# Patient Record
Sex: FEMALE | Race: WHITE
[De-identification: names, ages, dates, MRNs, and addresses within clinical notes are randomized per-mention and may not be internally consistent; named-entity substitution may affect disease eponyms.]

---

## 2018-03-21 ENCOUNTER — HOSPITAL ENCOUNTER (EMERGENCY)
Dept: HOSPITAL 45 - C.EDD | Age: 18
Discharge: HOME | End: 2018-03-21
Payer: COMMERCIAL

## 2018-03-21 VITALS — DIASTOLIC BLOOD PRESSURE: 60 MMHG | SYSTOLIC BLOOD PRESSURE: 118 MMHG | HEART RATE: 70 BPM | OXYGEN SATURATION: 99 %

## 2018-03-21 VITALS
BODY MASS INDEX: 26.7 KG/M2 | BODY MASS INDEX: 26.7 KG/M2 | WEIGHT: 160.28 LBS | HEIGHT: 65 IN | HEIGHT: 65 IN | WEIGHT: 160.28 LBS

## 2018-03-21 VITALS — TEMPERATURE: 97.52 F
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
## 2018-03-21 DIAGNOSIS — H57.12: ICD-10-CM

## 2018-03-21 DIAGNOSIS — T15.02XA: Primary | ICD-10-CM

## 2018-03-21 NOTE — EMERGENCY ROOM VISIT NOTE
ED Visit Note


First contact with patient:  13:38


CHIEF COMPLAINT: Left eye pain -contact with ethyl alcohol








HISTORY OF PRESENT ILLNESS:  This 17-year-old female patient presents to the 

emergency department via ambulance, complaining of pain in the left eye 

following splash of 3-4 drops of 200 proof ethyl alcohol while in biology lab.  

The patient states her lab partner was closing a test tube at either level.  

There was a small amount of liquid within the CAP, which squirted out of the 

tube and straight into her left eye.  The patient immediately washed out the 

eye at the eyewash station for 15 minutes.  EMS was contacted by the professor, 

and the patient was evaluated.  She initially was feeling better, however then 

her eye became more irritated and she was encouraged to come to the hospital.  

Overall, the patient is feeling well.  She denies any pain or burning.  She 

states the eye does feel slightly scratchy, but states this is significantly 

better than it was initially.  She states she feels that there still may be a 

slight amount of chemical in the eye which was not rinsed.  The patient states 

she has gotten hydrogen peroxide in her eye before, and states this is not 

nearly as bad.  The patient does wear contacts and glasses, but was wearing her 

glasses at the time of the incident.. There has been a constant moderate pain 

and irritation, redness and tearing in the eye.  There is no blurring of vision

, but light bothers the eye.  The vision has not been decreased over all.  The 

patient rates the pain as scratchiness and 2/10.  The patient has not had 

previous injuries to this eye.  Tetanus shot is up to date.








REVIEW OF SYSTEMS: A 6 system review of systems was completed with positives 

and pertinent negatives listed in the HPI. 








ALLERGIES: None








MEDICATIONS: OCPs, topical steroid








PMH: ACL surgery








SOCIAL HISTORY: The patient lives locally with her roommates.  She is a [employer] student.  She denies drug, alcohol, tobacco use.








PHYSICAL EXAM: Vital Signs: Reviewed Nurse's notes, vital signs stable.  GENERAL

: This is a 17 year old white female, in no acute distress, but who is 

uncomfortable from the eye problem.  Well-developed well-nourished.  EYES:  The 

pupils are equal round and reactive to light and accommodation.  EOMs are full 

and without tenderness.  There is discharge of clear tears from the left eye 

which is mildly injected.  There is no foreign body visible under the eyelid 

even after lid eversion.  Funduscopic exam reveals no hemorrhages, papilledema, 

or other abnormalities.  No foreign body was seen embedded in the cornea under 

slit lamp exam.  The cornea was clear and no hyphema was seen.  Fluorescein 

uptake was observed with ultraviolet light without evidence for corneal 

abrasion.








EMERGENCY DEPARTMENT COURSE: I examined the patient.  Initial pH level was 

tested in the left eye and was between 6.0-6.5. Alcaine 2 drops were placed in 

the patient's left eye.  The eye was flushed with 1 L normal saline solution 

Via [place] lens.  The patient was reassessed.  pH level in bilateral eyes was 

now at approximately 7.0. A slit lamp exam was performed as above.  The patient 

states she is now asymptomatic.  The patient was encouraged to follow-up 

outpatient with ophthalmology.  Discharge instructions reviewed.  The patient 

was discharged home in good condition.





I attest that I have personally reviewed the patient's current medication list. 


Patient was found to have normal blood pressure on screening and does not 

require follow-up. 





Etiologies such as chemical irritation, ulcer, conjunctivitis, corneal abrasion

, uveitis, glaucoma, periorbital cellulitis, orbital cellulitis, abscess, trauma

, as well as others were entertained. 





 


DIAGNOSIS:  Chemical exposure of eye


Current/Historical Medications


Scheduled


Birth Control Pills (Birth Control Pills), 1 TAB PO DAILY





Allergies


Coded Allergies:  


     No Known Allergies (Unverified , 3/21/18)





Vital Signs











  Date Time  Temp Pulse Resp B/P (MAP) Pulse Ox O2 Delivery O2 Flow Rate FiO2


 


3/21/18 13:30 36.4 78 16 131/70 95 Room Air  











Departure Information


Impression





 Primary Impression:  


 Chemical exposure of eye





Dispostion


Home / Self-Care





Condition


GOOD





Referrals


No Doctor, Assigned (PCP)








Solomon Yusuf M.D.





[facility]





Patient Instructions


ED Chemical Conjunctivitis, My [facility]





Additional Instructions





You were seen in the emergency department today for chemical exposure of the 

eye.  pH level was tested and is neutral at this time after copious irrigation.





Ibuprofen(Motrin, Advil) may be used for fever or pain.  Use 600mg every six 

hours as needed.  Take with food.  Avoid using more than 2400mg in a 24 hour 

period.  Do not use 2400mg per day for more than three consecutive days without 

physician direction.  Prolonged inappropriate use can lead to stomach upset or 

ulcers. 


(AND/OR)


Acetaminophen(Tylenol) may be used for fever or pain.  Use 1000mg every six 

hours as needed.  Avoid using more than 3000mg in a 24 hour period.  





Use moistening eye drops for the next few days to help with dryness and 

irritation.





Monitor for signs of infection including redness, purulent drainage, swelling, 

fever, or increased pain.





You should relax in a quiet, dark place for the rest of the day. You should 

wear sunglasses while outside for the next few days until your eyes are not as 

sensitive to the light.





You should schedule a follow-up appointment in 2-3 days with your Primary Care 

Provider or established Eye Doctor (Ophthalmologist) for further evaluation and 

treatment of your exposure.  You have been provided with the contact 

information for a local ophthalmologist.





Return to the Emergency Department if your current symptoms worsen despite 

treatment course outlined above, or if you develop any of the following symptoms

: intractable pain, visual disturbances, loss of vision, increased redness, 

swelling, drainage, or if you develop a fever.